# Patient Record
Sex: MALE | Race: WHITE | NOT HISPANIC OR LATINO
[De-identification: names, ages, dates, MRNs, and addresses within clinical notes are randomized per-mention and may not be internally consistent; named-entity substitution may affect disease eponyms.]

---

## 2021-11-07 ENCOUNTER — APPOINTMENT (OUTPATIENT)
Dept: DISASTER EMERGENCY | Facility: CLINIC | Age: 66
End: 2021-11-07

## 2021-11-07 DIAGNOSIS — Z01.818 ENCOUNTER FOR OTHER PREPROCEDURAL EXAMINATION: ICD-10-CM

## 2021-11-07 PROBLEM — Z00.00 ENCOUNTER FOR PREVENTIVE HEALTH EXAMINATION: Status: ACTIVE | Noted: 2021-11-07

## 2021-11-08 LAB — SARS-COV-2 N GENE NPH QL NAA+PROBE: NOT DETECTED

## 2022-04-18 PROBLEM — Z00.00 ENCOUNTER FOR PREVENTIVE HEALTH EXAMINATION: Noted: 2022-04-18

## 2022-04-25 ENCOUNTER — APPOINTMENT (OUTPATIENT)
Dept: ORTHOPEDIC SURGERY | Facility: CLINIC | Age: 67
End: 2022-04-25

## 2022-06-14 ENCOUNTER — APPOINTMENT (OUTPATIENT)
Dept: ORTHOPEDIC SURGERY | Facility: CLINIC | Age: 67
End: 2022-06-14

## 2022-06-16 ENCOUNTER — APPOINTMENT (OUTPATIENT)
Dept: ORTHOPEDIC SURGERY | Facility: CLINIC | Age: 67
End: 2022-06-16
Payer: MEDICARE

## 2022-06-16 VITALS — BODY MASS INDEX: 28.25 KG/M2 | HEIGHT: 67 IN | WEIGHT: 180 LBS

## 2022-06-16 PROCEDURE — 99214 OFFICE O/P EST MOD 30 MIN: CPT | Mod: 25

## 2022-06-16 PROCEDURE — 73030 X-RAY EXAM OF SHOULDER: CPT | Mod: RT

## 2022-06-16 PROCEDURE — 20611 DRAIN/INJ JOINT/BURSA W/US: CPT

## 2022-06-16 PROCEDURE — 76881 US COMPL JOINT R-T W/IMG: CPT | Mod: RT

## 2022-06-16 NOTE — PROCEDURE
[Large Joint Injection] : Large joint injection [Right] : of the right [Glenohumeral Joint] : glenohumeral joint [Pain] : pain [Inflammation] : inflammation [X-ray evidence of Osteoarthritis on this or prior visit] : x-ray evidence of Osteoarthritis on this or prior visit [Betadine] : betadine [Euflexxa] : Euflexxa [#1] : series #1 [] : Patient tolerated procedure well [Call if redness, pain or fever occur] : call if redness, pain or fever occur [Apply ice for 15min out of every hour for the next 12-24 hours as tolerated] : apply ice for 15 minutes out of every hour for the next 12-24 hours as tolerated [Previous OTC use and PT nontherapeutic] : patient has tried OTC's including aspirin, Ibuprofen, Aleve, etc or prescription NSAIDS, and/or exercises at home and/or physical therapy without satisfactory response [Patient had decreased mobility in the joint] : patient had decreased mobility in the joint [Glenohmeral injection] : glenohumeral injection [All ultrasound images have been permanently captured and stored accordingly in our picture archiving and communication system] : All ultrasound images have been permanently captured and stored accordingly in our picture archiving and communication system [Visualization of the needle and placement of injection was performed without complication] : visualization of the needle and placement of injection was performed without complication

## 2022-06-16 NOTE — REASON FOR VISIT
[FreeTextEntry2] : The patient has left shoulder moderate glenohumeral osteoarthritis with high grade partial tear of the supraspinatus tendon. The right shoulder had a moderate sized full thickness tear of the supraspinatus tendon with full thickness tear of the subscapularis tendon complete rupture of the biceps tendon.

## 2022-06-16 NOTE — DISCUSSION/SUMMARY
[de-identified] : The patient has left shoulder moderate glenohumeral osteoarthritis with high grade partial tear of the supraspinatus tendon. The right shoulder had a moderate sized full thickness tear of the supraspinatus tendon with full thickness tear of the subscapularis tendon complete rupture of the biceps tendon with moderate GHOA. \par \par The patient is started with a series of Euflexxa today.\par The patient tolerated the procedure well.\par U/S guidance used.\par \par He will follow up next week for the 2nd Euflexxa injection right shoulder.

## 2022-06-16 NOTE — HISTORY OF PRESENT ILLNESS
[de-identified] : The patient is a 67 year old male here to restart another course of Euflexxa in his right shoulder. The patient states it has helped in the past. The patient has complaints of pain with overhead activities. He has taken NSAIDs with little relief. He states he has cracking in the shoulder that causes pain.

## 2022-06-16 NOTE — PHYSICAL EXAM
[de-identified] : Constitutional: The general appearance of the patient is well developed, well nourished, no deformities and well groomed. Normal\par \par  Gait: Gait and function is as follows: normal gait.\par \par  Skin: Head and neck visualized skin is normal. Left upper extremity visualized skin is normal. Right upper extremity visualized skin is normal. \par \par  Cardiovascular: palpable radial pulse bilaterally.\par \par  Neurologic: The patient is oriented to time, place and person. Sensation to light touch intact in the bilateral upper extremities. Mood and Affect is normal.\par  [Right] : right shoulder [Standing] : standing [4___] : external rotation 4[unfilled]/5 [5___] : internal rotation 5[unfilled]/5 [] : motor and sensory intact distally [FreeTextEntry8] : Mild tenderness at the AC joint, and supraspinatus\par No tenderness at the biceps tendon [de-identified] : 4 out of 5 supraspinatus [TWNoteComboBox7] : active forward flexion 150 degrees [de-identified] : active abduction 70 degrees [TWNoteComboBox6] : internal rotation L5 [de-identified] : external rotation 60 degrees

## 2022-06-20 ENCOUNTER — RESULT REVIEW (OUTPATIENT)
Age: 67
End: 2022-06-20

## 2022-06-23 ENCOUNTER — APPOINTMENT (OUTPATIENT)
Dept: ORTHOPEDIC SURGERY | Facility: CLINIC | Age: 67
End: 2022-06-23

## 2022-06-23 PROCEDURE — 99214 OFFICE O/P EST MOD 30 MIN: CPT

## 2022-06-23 NOTE — DISCUSSION/SUMMARY
[de-identified] : Patient has a right shoulder large rotator cuff tear with moderate glenohumeral osteoarthritis .\par \par \par Patient will begin supervised PT once a week for pain and ROM.\par Surgery options including a rotator cuff repair surgery and reverse total shoulder replacement were discussed in detail with the patient.\par Patient will follow up in 6 weeks and we will consider surgery options at that time.\par Because of the large rotator cuff tear and moderate GHOA I believe the better option for this patient is reverse shoulder replacement.\par The patient was counseled that he will most likely lose motion after a shoulder replacement but would have good strength and pain relief.\par The patient was told that there is a moderate possibility of recurrent rotator cuff tear if we did proceed with rotator cuff repair surgery.\par The patient will decide which surgery he prefers.\par \par \par \par \par I, Dr. Clint Aranda, attest that this documentation was recorded by the scribe, in my presence, and that it accurately reflects the service I personally performed, and the decisions made by me with my edits as appropriate.  \par \par I, Jerzy Richardson, acting as scribe, attest that this documentation has been prepared under the direction and in the presence of Provider Clint Aranda MD.\par

## 2022-06-23 NOTE — PHYSICAL EXAM
[Right] : right shoulder [4___] : internal rotation 4[unfilled]/5 [de-identified] : Constitutional: The general appearance of the patient is well developed, well nourished, no deformities and well groomed.Normal\par  \par Gait: Gait and function is as follows:normal gait. \par  \par Skin: Head and neck visualized skin is normal.Left upper extremity visualized skin is normal.Right upper extremity visualized skin is normal.\par  \par Cardiovascular: palpable radial pulse bilaterally. \par  \par Neurologic: The patient is oriented to time, place and person.Sensation to light touch intact in the bilateral upper extremities.Mood and Affect is normal. \par \par  [] : no tenderness at bicipital groove [FreeTextEntry8] : no supra TTP.  [de-identified] : supra 4/5 [de-identified] : unable to perform belly press test.  [FreeTextEntry1] : MRI right shoulder 6/20/2022 [facility]: large full thickness rotator cuff tear of the supraspinatus tendon. moderate AC joint arthritis. degenerative tear of superior labrum. subscap tendon full thickness tear. biceps tendon ruptured. degenerative tear of anterior and posterior labrum. moderate GHOA. type 2 acromion. grade 3 atrophy of the supraspinatus muscle belly. [TWNoteComboBox7] : active forward flexion 160 degrees [de-identified] : active abduction 90 degrees [TWNoteComboBox6] : internal rotation L1 [de-identified] : external rotation 70 degrees

## 2022-06-23 NOTE — REASON FOR VISIT
[FreeTextEntry2] : Patient has a right shoulder large rotator cuff tear with moderate glenohumeral osteoarthritis .

## 2022-06-23 NOTE — HISTORY OF PRESENT ILLNESS
[de-identified] : Patient has worsening right shoulder pain. He denies trauma or injuries. He has weakness and restricted ROM. He has not tried PT. He denies injections. He is taking Advil for the pain but it has not been helpful.

## 2022-08-04 ENCOUNTER — APPOINTMENT (OUTPATIENT)
Dept: ORTHOPEDIC SURGERY | Facility: CLINIC | Age: 67
End: 2022-08-04

## 2022-08-04 VITALS — HEIGHT: 67 IN | WEIGHT: 180 LBS | BODY MASS INDEX: 28.25 KG/M2

## 2022-08-04 DIAGNOSIS — M25.511 PAIN IN RIGHT SHOULDER: ICD-10-CM

## 2022-08-04 DIAGNOSIS — M75.121 COMPLETE ROTATOR CUFF TEAR OR RUPTURE OF RIGHT SHOULDER, NOT SPECIFIED AS TRAUMATIC: ICD-10-CM

## 2022-08-04 DIAGNOSIS — M19.011 PRIMARY OSTEOARTHRITIS, RIGHT SHOULDER: ICD-10-CM

## 2022-08-04 PROCEDURE — 99213 OFFICE O/P EST LOW 20 MIN: CPT

## 2022-08-04 NOTE — DISCUSSION/SUMMARY
[de-identified] : Patient has a right shoulder large rotator cuff tear with moderate glenohumeral osteoarthritis .\par \par The patient is doing well.\par He continues to have improvement with PT. \par A new script is provided for him to continue PT and then transition into a home exercise program. \par \par We discussed surgery briefly. The is a candidate for a reverse total shoulder if he has declining function or increasing pain in the shoulder, this was discussed on his last visit. The patient would like to hold off at this time. \par \par He will follow up on an as needed basis.

## 2022-08-04 NOTE — PHYSICAL EXAM
[Normal Coordination] : normal coordination [Normal Sensation] : normal sensation [Normal Mood and Affect] : normal mood and affect [Orientated] : orientated [Able to Communicate] : able to communicate [Normal Skin] : normal skin [No obvious lymphadenopathy in areas examined] : no obvious lymphadenopathy in areas examined [Well Developed] : well developed [Well Nourished] : well nourished [Peripheral vascular exam is grossly normal] : peripheral vascular exam is grossly normal [No Respiratory Distress] : no respiratory distress [Right] : right shoulder [] : no deformity [Standing] : standing [Minimal] : minimal [2___] : external rotation 2[unfilled]/5 [5___] : internal rotation 5[unfilled]/5 [FreeTextEntry8] : + mild tenderness at the biceps tendon\par No tenderness at the AC joint or supraspinatus [de-identified] : 4 out of 5 supraspinatus [de-identified] : mild decreased sensation at he axillary nerve patch [TWNoteComboBox7] : active forward flexion 180 degrees [de-identified] : active abduction 80 degrees [TWNoteComboBox6] : internal rotation sacrum [de-identified] : external rotation 70 degrees

## 2022-08-04 NOTE — HISTORY OF PRESENT ILLNESS
[de-identified] : The patient is a 67 year old male. He states the pain has dramatically improved. He is taking Advil and Tylenol as needed. He has been in PT once a week and doing a HEP daily. He feels he continues to improve. He would like to continue with the PT. The patient has psoriatic arthritis and is having a flair up of pain in his hands. He has an appointment with his rheumatologist tomorrow.